# Patient Record
Sex: FEMALE | Race: WHITE | HISPANIC OR LATINO | Employment: UNEMPLOYED | URBAN - METROPOLITAN AREA
[De-identification: names, ages, dates, MRNs, and addresses within clinical notes are randomized per-mention and may not be internally consistent; named-entity substitution may affect disease eponyms.]

---

## 2021-02-02 ENCOUNTER — HOSPITAL ENCOUNTER (OUTPATIENT)
Facility: MEDICAL CENTER | Age: 26
End: 2021-02-02
Attending: FAMILY MEDICINE
Payer: OTHER GOVERNMENT

## 2021-02-02 ENCOUNTER — OFFICE VISIT (OUTPATIENT)
Dept: URGENT CARE | Facility: CLINIC | Age: 26
End: 2021-02-02
Payer: OTHER GOVERNMENT

## 2021-02-02 VITALS
TEMPERATURE: 97.6 F | HEIGHT: 60 IN | OXYGEN SATURATION: 99 % | HEART RATE: 107 BPM | BODY MASS INDEX: 26.31 KG/M2 | SYSTOLIC BLOOD PRESSURE: 110 MMHG | WEIGHT: 134 LBS | RESPIRATION RATE: 18 BRPM | DIASTOLIC BLOOD PRESSURE: 60 MMHG

## 2021-02-02 DIAGNOSIS — Z20.822 SUSPECTED COVID-19 VIRUS INFECTION: ICD-10-CM

## 2021-02-02 DIAGNOSIS — R11.0 NAUSEA: ICD-10-CM

## 2021-02-02 DIAGNOSIS — R19.7 DIARRHEA, UNSPECIFIED TYPE: ICD-10-CM

## 2021-02-02 DIAGNOSIS — R53.83 OTHER FATIGUE: ICD-10-CM

## 2021-02-02 PROCEDURE — U0003 INFECTIOUS AGENT DETECTION BY NUCLEIC ACID (DNA OR RNA); SEVERE ACUTE RESPIRATORY SYNDROME CORONAVIRUS 2 (SARS-COV-2) (CORONAVIRUS DISEASE [COVID-19]), AMPLIFIED PROBE TECHNIQUE, MAKING USE OF HIGH THROUGHPUT TECHNOLOGIES AS DESCRIBED BY CMS-2020-01-R: HCPCS

## 2021-02-02 PROCEDURE — U0005 INFEC AGEN DETEC AMPLI PROBE: HCPCS

## 2021-02-02 PROCEDURE — 99213 OFFICE O/P EST LOW 20 MIN: CPT | Mod: CS | Performed by: FAMILY MEDICINE

## 2021-02-02 RX ORDER — ONDANSETRON HYDROCHLORIDE 8 MG/1
8 TABLET, FILM COATED ORAL EVERY 8 HOURS PRN
Qty: 15 TAB | Refills: 0 | Status: SHIPPED | OUTPATIENT
Start: 2021-02-02

## 2021-02-02 ASSESSMENT — ENCOUNTER SYMPTOMS
VOMITING: 0
MYALGIAS: 1
FEVER: 0
SORE THROAT: 0
HEADACHES: 1
NAUSEA: 1
COUGH: 0
DIARRHEA: 1

## 2021-02-02 NOTE — PROGRESS NOTES
Subjective:     Adore Sandoval is a 25 y.o. female who presents for Fatigue (x1wk, feels extremely weak, dairrhea last week, nausea, feels like passing out)    HPI  Pt presents for evaluation of an acute problem  Pt feeling acutely ill for the past 1 week   Feeling fatigued, having some diarrhea, and having nausea  Started as diarrhea which is improving a little   Having nausea without vomiting   Having mild headaches   No fevers   Having myalgias     Review of Systems   Constitutional: Positive for malaise/fatigue. Negative for fever.   HENT: Negative for sore throat.    Respiratory: Negative for cough.    Gastrointestinal: Positive for diarrhea and nausea. Negative for vomiting.   Musculoskeletal: Positive for myalgias.   Skin: Negative for rash.   Neurological: Positive for headaches.     PMH:  has no past medical history of Pain.  MEDS:   Current Outpatient Medications:   •  ibuprofen (MOTRIN) 800 MG Tab, Take 1 Tab by mouth every 8 hours as needed (Cramping). (Patient not taking: Reported on 2/2/2021), Disp: 30 Tab, Rfl: 3  •  ferrous gluconate (FERGON) 324 (38 FE) MG Tab, Take 324 mg by mouth every morning with breakfast., Disp: , Rfl:   •  norethindrone-ethinyl estradiol (LOESTRIN FE 1/20) 1-20 MG-MCG per tablet, Take 1 Tab by mouth every day. (Patient not taking: Reported on 2/2/2021), Disp: 28 Tab, Rfl: 11  •  Prenatal MV-Min-Fe Fum-FA-DHA (PRENATAL 1 PO), Take  by mouth., Disp: , Rfl:   ALLERGIES: No Known Allergies  SURGHX:   Past Surgical History:   Procedure Laterality Date   • PASCUAL BY LAPAROSCOPY  4/19/2015    Procedure: with cholangiograms;  Surgeon: Dana Callahan M.D.;  Location: SURGERY [facility], [place];  Service:      SOCHX:  reports that she has never smoked. She has never used smokeless tobacco. She reports that she does not drink alcohol or use drugs.     Objective:   /60 (BP Location: Left arm, Patient Position: Sitting, BP Cuff Size: Adult)   Pulse (!) 107   Temp 36.4 °C (97.6  °F) (Temporal)   Resp 18   Ht 1.524 m (5')   Wt 60.8 kg (134 lb)   SpO2 99%   BMI 26.17 kg/m²     Physical Exam  Constitutional:       General: She is not in acute distress.     Appearance: She is well-developed. She is not diaphoretic.      Comments: Appears fatigued    HENT:      Head: Normocephalic and atraumatic.      Right Ear: Tympanic membrane, ear canal and external ear normal.      Left Ear: Tympanic membrane, ear canal and external ear normal.      Nose: Nose normal.      Mouth/Throat:      Mouth: Mucous membranes are moist.      Pharynx: Oropharynx is clear. No oropharyngeal exudate or posterior oropharyngeal erythema.   Eyes:      Extraocular Movements: Extraocular movements intact.      Conjunctiva/sclera: Conjunctivae normal.   Neck:      Trachea: No tracheal deviation.   Cardiovascular:      Rate and Rhythm: Normal rate and regular rhythm.   Pulmonary:      Effort: Pulmonary effort is normal. No respiratory distress.      Breath sounds: Normal breath sounds. No wheezing or rales.   Abdominal:      General: Abdomen is flat. Bowel sounds are normal. There is no distension.      Palpations: Abdomen is soft.      Tenderness: There is no guarding or rebound.      Comments: +Mild TTP in the left lower quadrant    Skin:     General: Skin is warm and dry.      Findings: No erythema or rash.   Neurological:      Mental Status: She is alert and oriented to person, place, and time.   Psychiatric:         Mood and Affect: Mood normal.         Behavior: Behavior normal.         Thought Content: Thought content normal.         Judgment: Judgment normal.       Assessment/Plan:   Assessment    1. Suspected COVID-19 virus infection  - ondansetron (ZOFRAN) 8 MG Tab; Take 1 Tab by mouth every 8 hours as needed for Nausea/Vomiting.  Dispense: 15 Tab; Refill: 0  - SARS-CoV-2 PCR (24 hour In-House): Collect NP swab in V[facility]; Future    2. Other fatigue    3. Diarrhea, unspecified type    4. Nausea  - ondansetron (ZOFRAN)  8 MG Tab; Take 1 Tab by mouth every 8 hours as needed for Nausea/Vomiting.  Dispense: 15 Tab; Refill: 0    Patient with possible COVID-19 infection.  COVID-19 testing sent and patient will remain on home isolation until test results available.  On her exam, patient does have some mild tenderness in the left lower.  If COVID-19 testing were to be negative, advised that further evaluation with abdominal CT could fully rule out diverticulitis.  However, patient not noticing abdominal pain prior to exam and her overall clinical picture is less likely diverticulitis.  Would prefer to avoid excess radiation if possible and will await results of COVID-19 testing first.  Patient advised to use Zofran as needed for nausea and try to at least increase her clear liquids if she is not hungry.  Reviewed other supportive care measures and will follow-up test results.

## 2021-02-03 DIAGNOSIS — Z20.822 SUSPECTED COVID-19 VIRUS INFECTION: ICD-10-CM

## 2021-02-03 LAB
COVID ORDER STATUS COVID19: NORMAL
SARS-COV-2 RNA RESP QL NAA+PROBE: NOTDETECTED
SPECIMEN SOURCE: NORMAL

## 2021-02-05 ENCOUNTER — TELEPHONE (OUTPATIENT)
Dept: URGENT CARE | Facility: CLINIC | Age: 26
End: 2021-02-05

## 2021-02-05 NOTE — TELEPHONE ENCOUNTER
Adore called office to request COVID results.  Patient was notified Not Detected COVID results.     Heath MORSE gave the okay to give results.

## 2021-02-06 ENCOUNTER — HOSPITAL ENCOUNTER (EMERGENCY)
Facility: MEDICAL CENTER | Age: 26
End: 2021-02-06
Attending: EMERGENCY MEDICINE
Payer: OTHER GOVERNMENT

## 2021-02-06 VITALS
DIASTOLIC BLOOD PRESSURE: 58 MMHG | WEIGHT: 133.6 LBS | TEMPERATURE: 98.7 F | HEART RATE: 94 BPM | OXYGEN SATURATION: 99 % | RESPIRATION RATE: 17 BRPM | HEIGHT: 59 IN | BODY MASS INDEX: 26.93 KG/M2 | SYSTOLIC BLOOD PRESSURE: 107 MMHG

## 2021-02-06 DIAGNOSIS — R53.83 FATIGUE, UNSPECIFIED TYPE: ICD-10-CM

## 2021-02-06 DIAGNOSIS — R63.0 DECREASED APPETITE: ICD-10-CM

## 2021-02-06 DIAGNOSIS — R19.7 DIARRHEA, UNSPECIFIED TYPE: ICD-10-CM

## 2021-02-06 LAB
ALBUMIN SERPL BCP-MCNC: 4.6 G/DL (ref 3.2–4.9)
ALBUMIN/GLOB SERPL: 1.5 G/DL
ALP SERPL-CCNC: 64 U/L (ref 30–99)
ALT SERPL-CCNC: 22 U/L (ref 2–50)
ANION GAP SERPL CALC-SCNC: 10 MMOL/L (ref 7–16)
AST SERPL-CCNC: 20 U/L (ref 12–45)
BASOPHILS # BLD AUTO: 0.7 % (ref 0–1.8)
BASOPHILS # BLD: 0.05 K/UL (ref 0–0.12)
BILIRUB SERPL-MCNC: 0.5 MG/DL (ref 0.1–1.5)
BUN SERPL-MCNC: 10 MG/DL (ref 8–22)
CALCIUM SERPL-MCNC: 10.1 MG/DL (ref 8.5–10.5)
CHLORIDE SERPL-SCNC: 102 MMOL/L (ref 96–112)
CO2 SERPL-SCNC: 26 MMOL/L (ref 20–33)
CREAT SERPL-MCNC: 0.57 MG/DL (ref 0.5–1.4)
EOSINOPHIL # BLD AUTO: 0.06 K/UL (ref 0–0.51)
EOSINOPHIL NFR BLD: 0.8 % (ref 0–6.9)
ERYTHROCYTE [DISTWIDTH] IN BLOOD BY AUTOMATED COUNT: 40.6 FL (ref 35.9–50)
GLOBULIN SER CALC-MCNC: 3.1 G/DL (ref 1.9–3.5)
GLUCOSE SERPL-MCNC: 108 MG/DL (ref 65–99)
HCG SERPL QL: NEGATIVE
HCT VFR BLD AUTO: 40.7 % (ref 37–47)
HGB BLD-MCNC: 13.8 G/DL (ref 12–16)
IMM GRANULOCYTES # BLD AUTO: 0.02 K/UL (ref 0–0.11)
IMM GRANULOCYTES NFR BLD AUTO: 0.3 % (ref 0–0.9)
LIPASE SERPL-CCNC: 21 U/L (ref 11–82)
LYMPHOCYTES # BLD AUTO: 2.51 K/UL (ref 1–4.8)
LYMPHOCYTES NFR BLD: 33.5 % (ref 22–41)
MCH RBC QN AUTO: 31.2 PG (ref 27–33)
MCHC RBC AUTO-ENTMCNC: 33.9 G/DL (ref 33.6–35)
MCV RBC AUTO: 92.1 FL (ref 81.4–97.8)
MONOCYTES # BLD AUTO: 0.53 K/UL (ref 0–0.85)
MONOCYTES NFR BLD AUTO: 7.1 % (ref 0–13.4)
NEUTROPHILS # BLD AUTO: 4.33 K/UL (ref 2–7.15)
NEUTROPHILS NFR BLD: 57.6 % (ref 44–72)
NRBC # BLD AUTO: 0 K/UL
NRBC BLD-RTO: 0 /100 WBC
PLATELET # BLD AUTO: 270 K/UL (ref 164–446)
PMV BLD AUTO: 10.4 FL (ref 9–12.9)
POTASSIUM SERPL-SCNC: 3.7 MMOL/L (ref 3.6–5.5)
PROT SERPL-MCNC: 7.7 G/DL (ref 6–8.2)
RBC # BLD AUTO: 4.42 M/UL (ref 4.2–5.4)
SODIUM SERPL-SCNC: 138 MMOL/L (ref 135–145)
WBC # BLD AUTO: 7.5 K/UL (ref 4.8–10.8)

## 2021-02-06 PROCEDURE — 85025 COMPLETE CBC W/AUTO DIFF WBC: CPT

## 2021-02-06 PROCEDURE — 80053 COMPREHEN METABOLIC PANEL: CPT

## 2021-02-06 PROCEDURE — 83690 ASSAY OF LIPASE: CPT

## 2021-02-06 PROCEDURE — 84703 CHORIONIC GONADOTROPIN ASSAY: CPT

## 2021-02-06 PROCEDURE — 99284 EMERGENCY DEPT VISIT MOD MDM: CPT

## 2021-02-06 RX ORDER — LOPERAMIDE HYDROCHLORIDE 2 MG/1
2 CAPSULE ORAL 4 TIMES DAILY PRN
Qty: 30 CAP | Refills: 0 | Status: SHIPPED | OUTPATIENT
Start: 2021-02-06

## 2021-02-07 NOTE — ED TRIAGE NOTES
Chief Complaint   Patient presents with   • Diarrhea     x 2 weeks, 5 episodes today. + nausea, denies vomiting or abd pain.      Pt ambulatory to triage with above complaint. Pt also c/o dizziness, loss of appetite and dry mouth. Denies fever. Negative for covid this past tues. Educated on triage process, encouraged to inform staff of any changes.

## 2021-02-07 NOTE — ED PROVIDER NOTES
ED Provider Note    CHIEF COMPLAINT  Chief Complaint   Patient presents with   • Diarrhea     x 2 weeks, 5 episodes today. + nausea, denies vomiting or abd pain.        HPI  Adorephong Cazares is a 25 y.o. female who presents to emergency department with chief complaint of feeling fatigued with a decreased appetite and diarrhea.  States she is had diarrhea on and off for the last 2 weeks check she had a few days normal stools and then had 5 bouts of diarrhea today.  Is not associated with specific abdominal pain is mild cramping at the time which is relieved with diarrhea.  She has had mild nausea but is more decreased appetite she has been able to force herself to eat and drink without any feelings of vomiting and she has not been vomiting.  No fevers no chills no cough no congestion no recent travel no bloody stools or bloody emesis she has not been on any by antibiotics and she does not have a history of C. difficile.  She had a lap jayla in the past but has not been sticking to the diet recommended after cholecystectomy.  She states she is just tired    REVIEW OF SYSTEMS  Positives as above. Pertinent negatives include vomiting fevers chills cough congestion chest pain shortness of breath flank pain dysuria hematuria easy bleeding or bruising unilateral bilateral leg swelling  All other review of systems are negative    PAST MEDICAL HISTORY       SOCIAL HISTORY  Social History     Tobacco Use   • Smoking status: Never Smoker   • Smokeless tobacco: Never Used   Substance and Sexual Activity   • Alcohol use: No   • Drug use: No   • Sexual activity: Yes     Partners: Male     Comment: None       SURGICAL HISTORY   has a past surgical history that includes jayla by laparoscopy (4/19/2015).    CURRENT MEDICATIONS  Home Medications    **Home medications have not yet been reviewed for this encounter**         ALLERGIES  No Known Allergies    PHYSICAL EXAM  VITAL SIGNS: /82   Pulse (!) 104   Temp 37.2 °C (98.9  "°F) (Temporal)   Resp 20   Ht 1.499 m (4' 11\")   Wt 60.6 kg (133 lb 9.6 oz)   LMP 01/20/2021   SpO2 99%   BMI 26.98 kg/m²    Pulse ox interpretation: I interpret this pulse ox as normal.  Constitutional: Alert in no apparent distress.  HENT: Normocephalic atraumatic, MMM  Eyes: PER, Conjunctiva normal, Non-icteric.   Neck: Normal range of motion, No tenderness, Supple, No stridor.   Cardiovascular: Regular rate and rhythm, no murmurs.   Thorax & Lungs: Normal breath sounds, No respiratory distress, No wheezing, No chest tenderness.   Abdomen: Bowel sounds normal, Soft, No tenderness, No pulsatile masses. No peritoneal signs.  Skin: Warm, Dry, No erythema, No rash.   Back: No bony tenderness, No CVA tenderness.   Extremities/MSK: Intact equal distal pulses, No edema, No tenderness, No cyanosis, no major deformities noted  Neurologic: Alert and oriented x3, No focal deficits noted.       DIFFERENTIAL DIAGNOSIS AND WORK UP PLAN    This is a 25 y.o. female who presents with ongoing diarrhea without any risk factors for C. difficile she has not had any recent travel her abdomen is soft and nontender she does not appear dehydrated on my examination I will evaluate for severe electrolyte abnormalities and we can get a stool sample and will send a stool culture but otherwise will just ensure we do not see any other severe electrolyte abnormalities    DIAGNOSTIC STUDIES / PROCEDURES    EKG  No results found for this or any previous visit.    LABS  Pertinent Lab Findings  CBC CMP lipase all within normal limits the patient is not pregnant unable to send stool samples      RADIOLOGY  No orders to display     The radiologist's interpretation of all radiological studies have been reviewed by me.      COURSE & MEDICAL DECISION MAKING  Pertinent Labs & Imaging studies reviewed. (See chart for details)    10:34 PM  I reassessed patient at the bedside she actually became upset when I told her that everything looks normal.  I " "told her that we can send her home with outpatient prescription for a stool study as well as with some Imodium to drink plenty of fluids and get lots of rest but at this time there is no indication for antibiotics admission and IV fluids she is very upset she states she just does not feel well she had a negative Covid test last week but otherwise she does not have any signs or symptoms that indicate Covid and no high risk factors.    I had orthostatics done which were all within normal limits patient is going to be discharged home she can return for any new or worsening issues.    /58   Pulse 94   Temp 37.1 °C (98.7 °F) (Temporal)   Resp 17   Ht 1.499 m (4' 11\")   Wt 60.6 kg (133 lb 9.6 oz)   LMP 01/20/2021   SpO2 99%   BMI 26.98 kg/m²         I verified that the patient was wearing a mask and I was wearing appropriate PPE every time I entered the room. The patient's mask was on the patient at all times during my encounter except for a brief view of the oropharynx.    The patient will return for new or worsening symptoms and is stable at the time of discharge.    The patient is referred to a primary physician for blood pressure management, diabetic screening, and for all other preventative health concerns.    DISPOSITION:  Patient will be discharged home in stable condition.    FOLLOW UP:  [facility], Emergency Dept  [address] [id number] [phone]    If symptoms worsen      OUTPATIENT MEDICATIONS:  Discharge Medication List as of 2/6/2021 10:47 PM      START taking these medications    Details   loperamide (IMODIUM) 2 MG Cap Take 1 Cap by mouth 4 times a day as needed for Diarrhea., Disp-30 Cap, R-0, Normal                 FINAL IMPRESSION  1. Diarrhea, unspecified type     2. Fatigue, unspecified type     3. Decreased appetite             Electronically signed by: Opal Simmons M.D., 2/6/2021 9:28 PM    This dictation has been created using voice " recognition software and/or scribes. The accuracy of the dictation is limited by the abilities of the software and the expertise of the scribes. I expect there may be some errors of grammar and possibly content. I made every attempt to manually correct the errors within my dictation. However, errors related to voice recognition software and/or scribes may still exist and should be interpreted within the appropriate context.